# Patient Record
Sex: FEMALE | Race: WHITE | NOT HISPANIC OR LATINO | URBAN - METROPOLITAN AREA
[De-identification: names, ages, dates, MRNs, and addresses within clinical notes are randomized per-mention and may not be internally consistent; named-entity substitution may affect disease eponyms.]

---

## 2022-01-24 ENCOUNTER — NEW PATIENT (OUTPATIENT)
Dept: URBAN - METROPOLITAN AREA CLINIC 14 | Facility: CLINIC | Age: 57
End: 2022-01-24

## 2022-01-24 DIAGNOSIS — H25.13: ICD-10-CM

## 2022-01-24 PROCEDURE — 99199ADVT ADVANCED VISION TESTING

## 2022-01-24 PROCEDURE — 92136 OPHTHALMIC BIOMETRY: CPT

## 2022-01-24 PROCEDURE — 99204 OFFICE O/P NEW MOD 45 MIN: CPT

## 2022-01-24 ASSESSMENT — KERATOMETRY
OS_AXISANGLE2_DEGREES: 57
OS_K2POWER_DIOPTERS: 40.50
OS_K1POWER_DIOPTERS: 40.00
OD_K1POWER_DIOPTERS: 39.79
OD_AXISANGLE2_DEGREES: 161
OD_K2POWER_DIOPTERS: 40.50
OS_AXISANGLE_DEGREES: 147
OD_AXISANGLE_DEGREES: 71

## 2022-01-24 ASSESSMENT — TONOMETRY
OD_IOP_MMHG: 13
OS_IOP_MMHG: 13

## 2022-01-24 ASSESSMENT — VISUAL ACUITY
OD_CC: 20/80
OS_GLARE: 20/400
OS_BCVA: 20/25
OD_BCVA: 20/80
OS_CC: 20/25

## 2022-01-24 NOTE — PATIENT DISCUSSION
The patient desires to be less dependent on glasses after cataract surgery. Plan for vivity with a goal of plano in both eyes.

## 2022-02-09 ENCOUNTER — POST-OP (OUTPATIENT)
Dept: URBAN - METROPOLITAN AREA CLINIC 14 | Facility: CLINIC | Age: 57
End: 2022-02-09

## 2022-02-09 DIAGNOSIS — Z98.890: ICD-10-CM

## 2022-02-09 DIAGNOSIS — Z96.1: ICD-10-CM

## 2022-02-09 PROCEDURE — 99024 POSTOP FOLLOW-UP VISIT: CPT

## 2022-02-09 ASSESSMENT — TONOMETRY
OS_IOP_MMHG: 14
OD_IOP_MMHG: 26

## 2022-02-09 ASSESSMENT — KERATOMETRY
OS_K1POWER_DIOPTERS: 40.00
OD_AXISANGLE_DEGREES: 71
OS_AXISANGLE2_DEGREES: 57
OD_K1POWER_DIOPTERS: 39.79
OD_K2POWER_DIOPTERS: 40.50
OS_AXISANGLE_DEGREES: 147
OS_K2POWER_DIOPTERS: 40.50
OD_AXISANGLE2_DEGREES: 161

## 2022-02-09 ASSESSMENT — VISUAL ACUITY
OD_SC: 20/40
OD_BCVA: 20/25
OS_SC: 20/40

## 2022-02-14 ENCOUNTER — POST-OP (OUTPATIENT)
Dept: URBAN - METROPOLITAN AREA CLINIC 14 | Facility: CLINIC | Age: 57
End: 2022-02-14

## 2022-02-14 DIAGNOSIS — Z96.1: ICD-10-CM

## 2022-02-14 DIAGNOSIS — H25.12: ICD-10-CM

## 2022-02-14 DIAGNOSIS — Z98.890: ICD-10-CM

## 2022-02-14 PROCEDURE — 99024 POSTOP FOLLOW-UP VISIT: CPT

## 2022-02-14 PROCEDURE — 92136 OPHTHALMIC BIOMETRY: CPT

## 2022-02-14 ASSESSMENT — KERATOMETRY
OD_K2POWER_DIOPTERS: 40.50
OS_AXISANGLE2_DEGREES: 57
OD_K1POWER_DIOPTERS: 39.79
OS_K2POWER_DIOPTERS: 40.50
OD_AXISANGLE_DEGREES: 71
OD_AXISANGLE2_DEGREES: 161
OS_AXISANGLE_DEGREES: 147
OS_K1POWER_DIOPTERS: 40.00

## 2022-02-14 ASSESSMENT — TONOMETRY: OD_IOP_MMHG: 16

## 2022-02-14 ASSESSMENT — VISUAL ACUITY
OS_BCVA: 20/25
OS_GLARE: 20/400
OD_SC: 20/30

## 2022-02-15 ENCOUNTER — POST-OP (OUTPATIENT)
Dept: URBAN - METROPOLITAN AREA CLINIC 14 | Facility: CLINIC | Age: 57
End: 2022-02-15

## 2022-02-15 DIAGNOSIS — Z98.42: ICD-10-CM

## 2022-02-15 DIAGNOSIS — Z96.1: ICD-10-CM

## 2022-02-15 PROCEDURE — 99024 POSTOP FOLLOW-UP VISIT: CPT

## 2022-02-15 ASSESSMENT — KERATOMETRY
OS_AXISANGLE_DEGREES: 147
OD_AXISANGLE_DEGREES: 71
OD_AXISANGLE2_DEGREES: 161
OS_AXISANGLE2_DEGREES: 57
OS_K1POWER_DIOPTERS: 40.00
OS_K2POWER_DIOPTERS: 40.50
OD_K1POWER_DIOPTERS: 39.79
OD_K2POWER_DIOPTERS: 40.50

## 2022-02-15 ASSESSMENT — VISUAL ACUITY
OS_SC: 20/50-1
OU_SC: 20/25-1

## 2022-02-15 ASSESSMENT — TONOMETRY: OS_IOP_MMHG: 24

## 2025-05-08 ENCOUNTER — NEW PATIENT (OUTPATIENT)
Age: 60
End: 2025-05-08

## 2025-05-08 DIAGNOSIS — H26.493: ICD-10-CM

## 2025-05-08 PROCEDURE — 92004 COMPRE OPH EXAM NEW PT 1/>: CPT

## 2025-07-10 ENCOUNTER — COMPREHENSIVE EXAM (OUTPATIENT)
Age: 60
End: 2025-07-10

## 2025-07-10 DIAGNOSIS — H26.493: ICD-10-CM

## 2025-07-10 PROCEDURE — 66821 AFTER CATARACT LASER SURGERY: CPT | Mod: NC,RT

## 2025-07-10 PROCEDURE — 99214 OFFICE O/P EST MOD 30 MIN: CPT

## 2025-08-07 ENCOUNTER — CLINIC PROCEDURE ONLY (OUTPATIENT)
Age: 60
End: 2025-08-07

## 2025-08-07 DIAGNOSIS — H26.492: ICD-10-CM

## 2025-08-07 PROCEDURE — 66821 AFTER CATARACT LASER SURGERY: CPT | Mod: 57,LT
